# Patient Record
Sex: FEMALE | Race: BLACK OR AFRICAN AMERICAN | Employment: OTHER | URBAN - METROPOLITAN AREA
[De-identification: names, ages, dates, MRNs, and addresses within clinical notes are randomized per-mention and may not be internally consistent; named-entity substitution may affect disease eponyms.]

---

## 2017-02-11 ENCOUNTER — APPOINTMENT (OUTPATIENT)
Dept: GENERAL RADIOLOGY | Age: 32
End: 2017-02-11
Attending: PHYSICIAN ASSISTANT
Payer: COMMERCIAL

## 2017-02-11 ENCOUNTER — HOSPITAL ENCOUNTER (OUTPATIENT)
Age: 32
Discharge: HOME | End: 2017-02-11
Payer: COMMERCIAL

## 2017-02-11 VITALS
DIASTOLIC BLOOD PRESSURE: 75 MMHG | RESPIRATION RATE: 20 BRPM | SYSTOLIC BLOOD PRESSURE: 142 MMHG | HEART RATE: 92 BPM | TEMPERATURE: 98 F | OXYGEN SATURATION: 99 %

## 2017-02-11 DIAGNOSIS — J11.1 INFLUENZA: Primary | ICD-10-CM

## 2017-02-11 DIAGNOSIS — J98.01 ACUTE BRONCHOSPASM: ICD-10-CM

## 2017-02-11 PROCEDURE — 99214 OFFICE O/P EST MOD 30 MIN: CPT

## 2017-02-11 PROCEDURE — 94640 AIRWAY INHALATION TREATMENT: CPT

## 2017-02-11 PROCEDURE — 99204 OFFICE O/P NEW MOD 45 MIN: CPT

## 2017-02-11 PROCEDURE — 71020 XR CHEST PA + LAT CHEST (CPT=71020): CPT

## 2017-02-11 RX ORDER — ALBUTEROL SULFATE 90 UG/1
2 AEROSOL, METERED RESPIRATORY (INHALATION) EVERY 4 HOURS PRN
Qty: 1 INHALER | Refills: 0 | Status: SHIPPED | OUTPATIENT
Start: 2017-02-11 | End: 2017-03-13

## 2017-02-11 RX ORDER — CODEINE PHOSPHATE AND GUAIFENESIN 10; 100 MG/5ML; MG/5ML
10 SOLUTION ORAL EVERY 6 HOURS PRN
Qty: 180 ML | Refills: 0 | Status: SHIPPED | OUTPATIENT
Start: 2017-02-11 | End: 2017-02-16

## 2017-02-11 RX ORDER — ALBUTEROL SULFATE 2.5 MG/3ML
2.5 SOLUTION RESPIRATORY (INHALATION) ONCE
Status: COMPLETED | OUTPATIENT
Start: 2017-02-11 | End: 2017-02-11

## 2017-02-11 RX ORDER — IPRATROPIUM BROMIDE AND ALBUTEROL SULFATE 2.5; .5 MG/3ML; MG/3ML
3 SOLUTION RESPIRATORY (INHALATION) ONCE
Status: COMPLETED | OUTPATIENT
Start: 2017-02-11 | End: 2017-02-11

## 2017-02-11 NOTE — ED PROVIDER NOTES
Patient Seen in: [facility] [place], [place] Immediate Care In [facility]    History   Patient presents with:  Cough/URI  Fever    Stated Complaint: cough & fever X 4 days    HPI    Robson Rogeliodarien is a 77-year-old female presents today for evaluation of cough, congestion and fever Smokeless Status: Never Used                        Alcohol Use: Yes           0.0 - 0.5 oz/week       0-1 Standard drinks or equivalent per week       Comment: occas      Review of Systems   All other systems reviewed and are negative.       Positive for s and the left lower field. She has no wheezes. She has no rhonchi. She has no rales. Musculoskeletal: She exhibits no edema or tenderness. Neurological: She is alert and oriented to person, place, and time. Skin: Skin is warm and dry.  She is not diaph mL by mouth every 6 (six) hours as needed for cough. Dispense:  180 mL   Refill:  0    MDM   Clinical impression: Influenza, bronchospasm  Plan: I review chest x-ray findings with the patient.   On reexamination after a DuoNeb and albuterol treatments, pa

## 2017-02-11 NOTE — ED INITIAL ASSESSMENT (HPI)
C/O dry cough for 4 days taking Dayquil, Nyquil, And Vicks with no relief. Chest hurts from coughing, vomiting twice last night from coughing so hard.   Fever started last night

## 2017-02-12 NOTE — ED NOTES
Called patient regarding Smart ER fax. Pt states she had low grade temp- 100.4 and felt dizzy upon waking this am. Pt states dizziness lasted only briefly. Feeling better this afternoon.   Reminded patient that cough syrup with codeine can cause dizziness

## 2017-06-28 ENCOUNTER — OFFICE VISIT (OUTPATIENT)
Dept: INTERNAL MEDICINE CLINIC | Facility: CLINIC | Age: 32
End: 2017-06-28

## 2017-06-28 ENCOUNTER — HOSPITAL ENCOUNTER (OUTPATIENT)
Dept: ULTRASOUND IMAGING | Age: 32
Discharge: HOME | End: 2017-06-28
Attending: PHYSICIAN ASSISTANT
Payer: COMMERCIAL

## 2017-06-28 ENCOUNTER — LAB ENCOUNTER (OUTPATIENT)
Dept: LAB | Age: 32
End: 2017-06-28
Attending: PHYSICIAN ASSISTANT
Payer: COMMERCIAL

## 2017-06-28 VITALS
HEIGHT: 61 IN | DIASTOLIC BLOOD PRESSURE: 70 MMHG | HEART RATE: 60 BPM | RESPIRATION RATE: 16 BRPM | WEIGHT: 236 LBS | SYSTOLIC BLOOD PRESSURE: 112 MMHG | TEMPERATURE: 98 F | BODY MASS INDEX: 44.56 KG/M2

## 2017-06-28 DIAGNOSIS — R11.2 NON-INTRACTABLE VOMITING WITH NAUSEA, UNSPECIFIED VOMITING TYPE: ICD-10-CM

## 2017-06-28 DIAGNOSIS — R10.9 ABDOMINAL CRAMPING: ICD-10-CM

## 2017-06-28 DIAGNOSIS — R10.32 LLQ ABDOMINAL PAIN: ICD-10-CM

## 2017-06-28 DIAGNOSIS — R10.11 RUQ ABDOMINAL PAIN: ICD-10-CM

## 2017-06-28 DIAGNOSIS — R11.2 NON-INTRACTABLE VOMITING WITH NAUSEA, UNSPECIFIED VOMITING TYPE: Primary | ICD-10-CM

## 2017-06-28 DIAGNOSIS — R21 RASH: ICD-10-CM

## 2017-06-28 PROCEDURE — 84443 ASSAY THYROID STIM HORMONE: CPT

## 2017-06-28 PROCEDURE — 99214 OFFICE O/P EST MOD 30 MIN: CPT | Performed by: PHYSICIAN ASSISTANT

## 2017-06-28 PROCEDURE — 76700 US EXAM ABDOM COMPLETE: CPT | Performed by: PHYSICIAN ASSISTANT

## 2017-06-28 PROCEDURE — 81003 URINALYSIS AUTO W/O SCOPE: CPT | Performed by: PHYSICIAN ASSISTANT

## 2017-06-28 PROCEDURE — 81025 URINE PREGNANCY TEST: CPT | Performed by: PHYSICIAN ASSISTANT

## 2017-06-28 PROCEDURE — 36415 COLL VENOUS BLD VENIPUNCTURE: CPT

## 2017-06-28 PROCEDURE — 85025 COMPLETE CBC W/AUTO DIFF WBC: CPT

## 2017-06-28 PROCEDURE — 83690 ASSAY OF LIPASE: CPT

## 2017-06-28 PROCEDURE — 80053 COMPREHEN METABOLIC PANEL: CPT

## 2017-06-28 NOTE — PROGRESS NOTES
King Harvey is a 32year old female. HPI:   Patient presents for GI symptoms x 6 months. C/o episodes of uncontrollable diarrhea (loose stool, not watery) occurring a few times a month. Feeling nauseated on nearly a daily basis.   Has had two epi pain  GI: per HPI  : per HPI  NEURO: denies headaches  EXT: denies edema    EXAM:   /70   Pulse 60   Temp 98.3 °F (36.8 °C) (Oral)   Resp 16   Ht 61\"   Wt 236 lb   BMI 44.59 kg/m²   GENERAL: well developed, well nourished, in no acute distress  SK

## 2017-06-28 NOTE — PATIENT INSTRUCTIONS
GI Symptoms:  - start Prilosec (omeprazole) 20 mg - take 1 tablet each morning with a full glass of water at least 30 minutes before having anything else to eat or drink  - minimize caffeine, alcohol, citrus fruits/juices, spicy foods, and fried/fatty/grea

## 2017-08-18 ENCOUNTER — HOSPITAL ENCOUNTER (OUTPATIENT)
Dept: GENERAL RADIOLOGY | Age: 32
Discharge: HOME | End: 2017-08-18
Attending: PHYSICIAN ASSISTANT
Payer: MEDICAID

## 2017-08-18 ENCOUNTER — OFFICE VISIT (OUTPATIENT)
Dept: INTERNAL MEDICINE CLINIC | Facility: CLINIC | Age: 32
End: 2017-08-18

## 2017-08-18 VITALS
DIASTOLIC BLOOD PRESSURE: 80 MMHG | BODY MASS INDEX: 41.54 KG/M2 | WEIGHT: 220 LBS | OXYGEN SATURATION: 98 % | SYSTOLIC BLOOD PRESSURE: 122 MMHG | TEMPERATURE: 98 F | HEART RATE: 79 BPM | HEIGHT: 61 IN

## 2017-08-18 DIAGNOSIS — M79.671 RIGHT FOOT PAIN: ICD-10-CM

## 2017-08-18 DIAGNOSIS — M54.41 ACUTE BILATERAL LOW BACK PAIN WITH RIGHT-SIDED SCIATICA: Primary | ICD-10-CM

## 2017-08-18 PROCEDURE — 73630 X-RAY EXAM OF FOOT: CPT | Performed by: PHYSICIAN ASSISTANT

## 2017-08-18 PROCEDURE — 99213 OFFICE O/P EST LOW 20 MIN: CPT | Performed by: PHYSICIAN ASSISTANT

## 2017-08-18 RX ORDER — HYDROCODONE BITARTRATE AND ACETAMINOPHEN 5; 325 MG/1; MG/1
1 TABLET ORAL EVERY 6 HOURS PRN
COMMUNITY
End: 2020-01-06 | Stop reason: ALTCHOICE

## 2017-08-18 RX ORDER — CYCLOBENZAPRINE HCL 5 MG
5 TABLET ORAL 3 TIMES DAILY PRN
COMMUNITY
End: 2020-01-06 | Stop reason: ALTCHOICE

## 2017-08-18 NOTE — PATIENT INSTRUCTIONS
Back Pain / Sciatica:  - may take tylenol (acetaminophen) 1,000 mg every 8 hours as needed (do not exceed 3,000 mg daily)  - may take ibuprofen 600 mg every 8 hours WITH food as needed  - muscle relaxer as needed  - alternate ice and heat (10-15 minutes at

## 2017-08-18 NOTE — PROGRESS NOTES
HPI:   Jose Augustine is a 32year old female who is here for f/u from ER visit on 8/16/17 at [facility].  She was seen for R sided low back pain which began radiating into the lateral aspect of her R thigh, stops at knee.   Describes a consta vomiting, change in BMs  : denies dysuria, hematuria  MUSCULOSKELETAL: denies other joint pain or swelling  NEURO: per HPI    EXAM:   /80 (BP Location: Left arm, Patient Position: Sitting, Cuff Size: large)   Pulse 79   Temp 98.1 °F (36.7 °C) (Oral

## 2018-01-15 ENCOUNTER — TELEPHONE (OUTPATIENT)
Dept: INTERNAL MEDICINE CLINIC | Facility: CLINIC | Age: 33
End: 2018-01-15

## 2018-01-15 NOTE — TELEPHONE ENCOUNTER
Patient called same day to cancel appointment. Notified of [address] [place] [address] cancellation policy and stated that this would be considered a NO SHOW.  Patient felt that it was inappropriate that her appointment be considered a no show since we were closed yesterday an

## 2018-05-03 ENCOUNTER — MED REC SCAN ONLY (OUTPATIENT)
Dept: INTERNAL MEDICINE CLINIC | Facility: CLINIC | Age: 33
End: 2018-05-03

## 2019-01-22 ENCOUNTER — HOSPITAL ENCOUNTER (EMERGENCY)
Facility: HOSPITAL | Age: 34
Discharge: HOME | End: 2019-01-22
Attending: PEDIATRICS
Payer: MEDICAID

## 2019-01-22 ENCOUNTER — APPOINTMENT (OUTPATIENT)
Dept: GENERAL RADIOLOGY | Facility: HOSPITAL | Age: 34
End: 2019-01-22
Attending: PEDIATRICS
Payer: MEDICAID

## 2019-01-22 VITALS
RESPIRATION RATE: 18 BRPM | OXYGEN SATURATION: 100 % | HEART RATE: 87 BPM | BODY MASS INDEX: 47.2 KG/M2 | HEIGHT: 61 IN | DIASTOLIC BLOOD PRESSURE: 83 MMHG | SYSTOLIC BLOOD PRESSURE: 137 MMHG | WEIGHT: 250 LBS | TEMPERATURE: 99 F

## 2019-01-22 DIAGNOSIS — S46.919A REPETITIVE STRAIN INJURY OF UPPER EXTREMITY: Primary | ICD-10-CM
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
## 2019-01-22 PROCEDURE — 99283 EMERGENCY DEPT VISIT LOW MDM: CPT

## 2019-01-22 PROCEDURE — 73110 X-RAY EXAM OF WRIST: CPT | Performed by: PEDIATRICS

## 2019-01-22 NOTE — ED PROVIDER NOTES
Patient Seen in: [facility] Emergency Department    History   Patient presents with:  Upper Extremity Injury (musculoskeletal)    Stated Complaint: right hand pain and swelling    HPI    Patient is a 79-year-old female here with complaint of right h or gallops. Abdomen: Soft, nontender, nondistended. Bowel sounds present throughout. Extremities: Warm and well perfused. Dermatologic exam: No rashes or lesions. Neurologic exam: Cranial nerves 2-12 grossly intact.     Orthopedic exam: Slight swelling

## 2020-01-06 ENCOUNTER — OFFICE VISIT (OUTPATIENT)
Dept: INTERNAL MEDICINE CLINIC | Facility: CLINIC | Age: 35
End: 2020-01-06
Payer: MEDICAID

## 2020-01-06 VITALS
RESPIRATION RATE: 16 BRPM | SYSTOLIC BLOOD PRESSURE: 116 MMHG | BODY MASS INDEX: 41.86 KG/M2 | TEMPERATURE: 98 F | DIASTOLIC BLOOD PRESSURE: 66 MMHG | HEART RATE: 76 BPM | HEIGHT: 61 IN | WEIGHT: 221.75 LBS

## 2020-01-06 DIAGNOSIS — E66.01 MORBID OBESITY WITH BMI OF 40.0-44.9, ADULT (HCC): ICD-10-CM

## 2020-01-06 DIAGNOSIS — R10.2 PELVIC PAIN: ICD-10-CM

## 2020-01-06 DIAGNOSIS — N94.6 DYSMENORRHEA: ICD-10-CM

## 2020-01-06 DIAGNOSIS — Z00.00 ANNUAL PHYSICAL EXAM: Primary | ICD-10-CM

## 2020-01-06 DIAGNOSIS — R10.31 RLQ ABDOMINAL PAIN: ICD-10-CM

## 2020-01-06 DIAGNOSIS — Z12.4 CERVICAL CANCER SCREENING: ICD-10-CM

## 2020-01-06 PROCEDURE — 88175 CYTOPATH C/V AUTO FLUID REDO: CPT | Performed by: PHYSICIAN ASSISTANT

## 2020-01-06 PROCEDURE — 99395 PREV VISIT EST AGE 18-39: CPT | Performed by: PHYSICIAN ASSISTANT

## 2020-01-06 RX ORDER — GABAPENTIN 300 MG/1
300 CAPSULE ORAL
COMMUNITY
Start: 2019-10-02 | End: 2020-01-06 | Stop reason: ALTCHOICE

## 2020-01-06 NOTE — PATIENT INSTRUCTIONS
Please do your lab work ASAP. Remember to fast for 10-12 hours but drink plenty of water.     Please call Danyel Dennis at [phone] to set up the following tests:  - pelvic ultrasound

## 2020-01-06 NOTE — PROGRESS NOTES
Nahomy Hayes is a 29year old female who presents for a complete physical exam.   HPI:   Pt presents for annual wellness screening. C/o irregular periods for the past two years. Periods coming every 15-30 days, last 3-4 days.   C/o intermittent righ arthralgias  NEURO: denies headaches, numbness, tingling, weakness  BREAST: no pain, discharge, or lumps  HEMATOLOGIC: denies easy bruising or bleeding  LYMPH: denies swollen lymph nodes  ENDOCRINE: denies hot/cold intolerance    EXAM:   /66 (BP Loca 2016.    The patient indicates understanding of these issues and agrees to the plan. The patient is asked to return here in 1 year for wellness visit.

## 2020-02-13 ENCOUNTER — TELEPHONE (OUTPATIENT)
Dept: INTERNAL MEDICINE CLINIC | Facility: CLINIC | Age: 35
End: 2020-02-13

## 2020-02-13 DIAGNOSIS — L65.9 HAIR LOSS: Primary | ICD-10-CM

## 2020-02-13 NOTE — TELEPHONE ENCOUNTER
Patient calling in requesting a referral/order to see a Dermatologist, Dr Jose Velazquez, at [facility]. Pt stated she already contacted her insurance ([facility]), and that specialist is covered. Pt stated she is experiencing hair loss, and the derm will assist with scalp care and rejuvenation. Pt has an appointment on Monday, 02/17/2020. Please call pt with order status.

## 2020-02-18 ENCOUNTER — LAB ENCOUNTER (OUTPATIENT)
Dept: LAB | Age: 35
End: 2020-02-18
Attending: DERMATOLOGY
Payer: MEDICAID

## 2020-02-18 DIAGNOSIS — N94.6 DYSMENORRHEA: ICD-10-CM

## 2020-02-18 DIAGNOSIS — L66.9 ALOPECIA CICATRISATA: Primary | ICD-10-CM

## 2020-02-18 DIAGNOSIS — Z00.00 ANNUAL PHYSICAL EXAM: ICD-10-CM

## 2020-02-18 DIAGNOSIS — E66.01 MORBID OBESITY WITH BMI OF 40.0-44.9, ADULT (HCC): ICD-10-CM

## 2020-02-18 LAB
ANION GAP SERPL CALC-SCNC: 7 MMOL/L (ref 0–18)
BASOPHILS # BLD AUTO: 0.04 X10(3) UL (ref 0–0.2)
BASOPHILS NFR BLD AUTO: 0.6 %
BUN BLD-MCNC: 16 MG/DL (ref 7–18)
BUN/CREAT SERPL: 20.5 (ref 10–20)
CALCIUM BLD-MCNC: 8.8 MG/DL (ref 8.5–10.1)
CHLORIDE SERPL-SCNC: 106 MMOL/L (ref 98–112)
CO2 SERPL-SCNC: 21 MMOL/L (ref 21–32)
CREAT BLD-MCNC: 0.78 MG/DL (ref 0.55–1.02)
DEPRECATED HBV CORE AB SER IA-ACNC: 23.2 NG/ML (ref 12–160)
DEPRECATED RDW RBC AUTO: 39.4 FL (ref 35.1–46.3)
EOSINOPHIL # BLD AUTO: 0.09 X10(3) UL (ref 0–0.7)
EOSINOPHIL NFR BLD AUTO: 1.4 %
ERYTHROCYTE [DISTWIDTH] IN BLOOD BY AUTOMATED COUNT: 13.9 % (ref 11–15)
GLUCOSE BLD-MCNC: 86 MG/DL (ref 70–99)
HCT VFR BLD AUTO: 40.5 % (ref 35–48)
HGB BLD-MCNC: 13 G/DL (ref 12–16)
IMM GRANULOCYTES # BLD AUTO: 0.01 X10(3) UL (ref 0–1)
IMM GRANULOCYTES NFR BLD: 0.2 %
IRON SATURATION: 14 % (ref 15–50)
IRON SERPL-MCNC: 51 UG/DL (ref 50–170)
LYMPHOCYTES # BLD AUTO: 1.56 X10(3) UL (ref 1–4)
LYMPHOCYTES NFR BLD AUTO: 24.6 %
MCH RBC QN AUTO: 25.2 PG (ref 26–34)
MCHC RBC AUTO-ENTMCNC: 32.1 G/DL (ref 31–37)
MCV RBC AUTO: 78.6 FL (ref 80–100)
MONOCYTES # BLD AUTO: 0.33 X10(3) UL (ref 0.1–1)
MONOCYTES NFR BLD AUTO: 5.2 %
NEUTROPHILS # BLD AUTO: 4.31 X10 (3) UL (ref 1.5–7.7)
NEUTROPHILS # BLD AUTO: 4.31 X10(3) UL (ref 1.5–7.7)
NEUTROPHILS NFR BLD AUTO: 68 %
OSMOLALITY SERPL CALC.SUM OF ELEC: 278 MOSM/KG (ref 275–295)
PATIENT FASTING Y/N/NP: YES
PLATELET # BLD AUTO: 386 10(3)UL (ref 150–450)
POTASSIUM SERPL-SCNC: 3.7 MMOL/L (ref 3.5–5.1)
RBC # BLD AUTO: 5.15 X10(6)UL (ref 3.8–5.3)
SODIUM SERPL-SCNC: 134 MMOL/L (ref 136–145)
TOTAL IRON BINDING CAPACITY: 362 UG/DL (ref 240–450)
TRANSFERRIN SERPL-MCNC: 243 MG/DL (ref 200–360)
TSI SER-ACNC: 1.21 MIU/ML (ref 0.36–3.74)
VIT D+METAB SERPL-MCNC: 10.6 NG/ML (ref 30–100)
WBC # BLD AUTO: 6.3 X10(3) UL (ref 4–11)

## 2020-02-18 PROCEDURE — 82728 ASSAY OF FERRITIN: CPT

## 2020-02-18 PROCEDURE — 80048 BASIC METABOLIC PNL TOTAL CA: CPT

## 2020-02-18 PROCEDURE — 84443 ASSAY THYROID STIM HORMONE: CPT

## 2020-02-18 PROCEDURE — 83540 ASSAY OF IRON: CPT

## 2020-02-18 PROCEDURE — 83550 IRON BINDING TEST: CPT

## 2020-02-18 PROCEDURE — 82306 VITAMIN D 25 HYDROXY: CPT

## 2020-02-18 PROCEDURE — 36415 COLL VENOUS BLD VENIPUNCTURE: CPT

## 2020-02-18 PROCEDURE — 85025 COMPLETE CBC W/AUTO DIFF WBC: CPT

## 2020-03-02 ENCOUNTER — HOSPITAL ENCOUNTER (OUTPATIENT)
Dept: ULTRASOUND IMAGING | Age: 35
Discharge: HOME | End: 2020-03-02
Attending: PHYSICIAN ASSISTANT
Payer: MEDICAID

## 2020-03-02 DIAGNOSIS — R10.2 PELVIC PAIN: ICD-10-CM

## 2020-03-02 DIAGNOSIS — N94.6 DYSMENORRHEA: ICD-10-CM

## 2020-03-02 DIAGNOSIS — R10.31 RLQ ABDOMINAL PAIN: ICD-10-CM

## 2020-05-19 ENCOUNTER — OFFICE VISIT (OUTPATIENT)
Dept: INTERNAL MEDICINE CLINIC | Facility: CLINIC | Age: 35
End: 2020-05-19
Payer: MEDICAID

## 2020-05-19 ENCOUNTER — TELEPHONE (OUTPATIENT)
Dept: INTERNAL MEDICINE CLINIC | Facility: CLINIC | Age: 35
End: 2020-05-19

## 2020-05-19 VITALS
SYSTOLIC BLOOD PRESSURE: 118 MMHG | RESPIRATION RATE: 16 BRPM | HEIGHT: 61 IN | WEIGHT: 223.25 LBS | DIASTOLIC BLOOD PRESSURE: 74 MMHG | BODY MASS INDEX: 42.15 KG/M2 | HEART RATE: 79 BPM | OXYGEN SATURATION: 95 % | TEMPERATURE: 97 F

## 2020-05-19 DIAGNOSIS — S06.0X0A CONCUSSION WITHOUT LOSS OF CONSCIOUSNESS, INITIAL ENCOUNTER: Primary | ICD-10-CM

## 2020-05-19 PROCEDURE — 99213 OFFICE O/P EST LOW 20 MIN: CPT | Performed by: NURSE PRACTITIONER

## 2020-05-19 PROCEDURE — 3078F DIAST BP <80 MM HG: CPT | Performed by: NURSE PRACTITIONER

## 2020-05-19 PROCEDURE — 3008F BODY MASS INDEX DOCD: CPT | Performed by: NURSE PRACTITIONER

## 2020-05-19 PROCEDURE — 3074F SYST BP LT 130 MM HG: CPT | Performed by: NURSE PRACTITIONER

## 2020-05-19 RX ORDER — CLOBETASOL PROPIONATE 0.5 MG/G
OINTMENT TOPICAL
COMMUNITY
Start: 2020-03-16

## 2020-05-19 NOTE — PROGRESS NOTES
Patient presents with:  Head Injury    HPI:   Wolfgang Hawthorne is a 58 [address] old female who presents to the office for evaluation of concussion.      Date of incident: 5/17/20  Details: slipped on water in basement and hit head on work station that is metal, excessive computer time  · Tylenol/ibuprofen as needed. Call/return with any worsening symptoms or is symptoms do not continue to improve. Worsening sx include: You vomit more than 3 times, severe headache, or a headache that gets worse.   You have a sei

## 2020-05-19 NOTE — TELEPHONE ENCOUNTER
Pt had a fall on Sunday. Slip and fell due to stepping on wet floor. Sore on top of head and R ribs    Woke up yesterday and was \"ok\" at morning time   During afternoon feeling tired/ nausea/ dizzy when sitting down.      Today feels nausea/ sensiti

## 2020-05-19 NOTE — TELEPHONE ENCOUNTER
rec in office appt as long as no fever or sick symptoms. Spoke with KR -- please schedule with her at 4 or 4:30 today if pt able to make it. Clint notified.

## 2020-05-19 NOTE — TELEPHONE ENCOUNTER
Pt offered appointment for today but was not able to come into office. She was also offered several appointment over the next few days but stated that she will go to [facility] today for eval/treatment.

## 2020-05-19 NOTE — PATIENT INSTRUCTIONS
Concussion    A concussion is a type of brain injury. It can be caused by a direct hit or blow to the head, neck, face, or body.  The force of the blow makes the head and brain shake quickly back and forth. In some cases you may lose consciousness. Depend · You may use acetaminophen to control pain, unless another pain medicine was prescribed.  Don't use aspirin or ibuprofen after a head injury. If you have long-lasting (chronic) liver or kidney disease, talk with your healthcare provider before using these · Fluid draining from or bleeding from the nose or ears  Karen last reviewed this educational content on 6/1/2018  © 0225-5944 [employer]. [address] [facility], [address] [place] [address]. All rights reserved.  This information is not intended as

## 2020-08-13 ENCOUNTER — TELEPHONE (OUTPATIENT)
Dept: INTERNAL MEDICINE CLINIC | Facility: CLINIC | Age: 35
End: 2020-08-13

## 2020-08-13 NOTE — TELEPHONE ENCOUNTER
Patient calling to request order for COVID 19 testing; she past few days feels:  Fatigued,sore throat, vomiting, headache, body pain/muscle, no fever and nausea.   She thought it was because she worked out, was a smoker,

## 2020-09-14 ENCOUNTER — OFFICE VISIT (OUTPATIENT)
Dept: INTERNAL MEDICINE CLINIC | Facility: CLINIC | Age: 35
End: 2020-09-14
Payer: MEDICAID

## 2020-09-14 VITALS
TEMPERATURE: 98 F | HEART RATE: 78 BPM | WEIGHT: 231.19 LBS | DIASTOLIC BLOOD PRESSURE: 66 MMHG | SYSTOLIC BLOOD PRESSURE: 118 MMHG | RESPIRATION RATE: 16 BRPM | OXYGEN SATURATION: 99 % | HEIGHT: 61 IN | BODY MASS INDEX: 43.65 KG/M2

## 2020-09-14 DIAGNOSIS — M25.50 ARTHRALGIA, UNSPECIFIED JOINT: Primary | ICD-10-CM

## 2020-09-14 PROCEDURE — 99213 OFFICE O/P EST LOW 20 MIN: CPT | Performed by: INTERNAL MEDICINE

## 2020-09-14 PROCEDURE — 3074F SYST BP LT 130 MM HG: CPT | Performed by: INTERNAL MEDICINE

## 2020-09-14 PROCEDURE — 3078F DIAST BP <80 MM HG: CPT | Performed by: INTERNAL MEDICINE

## 2020-09-14 PROCEDURE — 3008F BODY MASS INDEX DOCD: CPT | Performed by: INTERNAL MEDICINE

## 2020-09-14 NOTE — PATIENT INSTRUCTIONS
Take advil 600 mg twice a day with food. Please have blood tests done. No need to fast for this. After blood tests, we will get in touch.  Vitamin D 2000 international units daily    **Effective August 17th, 2020 - To keep our patients and staff safe, ALL L

## 2020-09-14 NOTE — PROGRESS NOTES
Patient Office Visit    ASSESSMENT AND PLAN:   (M25.50) Arthralgia, unspecified joint  (primary encounter diagnosis)  Plan: ALT (SGPT), CALCIUM, CREATININE, SERUM,         HEMOGLOBIN A1C,         VITAMIN B12, VITAMIN D, 25-HYDROXY, RHEUMATOID         ARTHR Expiration Date: 9/14/2021      Rheumatoid Arthritis Factor [E]          Standing Status: Future          Standing Expiration Date: 9/14/2021      CK (Creatine Kinase) (Not Creatinine) (E)          Standing Status: Future          Standing Expiration Date: Social History:  Social History    Tobacco Use      Smoking status: Never Smoker      Smokeless tobacco: Never Used    Alcohol use: Yes      Comment: occ    Drug use: Yes      Frequency: 2.0 times per week      Types: Cannabis    Family History:  Fam bilaterally  Cardiovascular: nl s1 s2 no m/r/g  Pulmonary/Chest: CTA bilaterally with no wheezes  Musculoskeletal:  normal ROM in UE and LE, no swelling of the joints in the hands noted   Neurological:  no weakness in UE and LE, reflexes are normal  Psychi

## 2020-09-16 ENCOUNTER — LAB ENCOUNTER (OUTPATIENT)
Dept: LAB | Age: 35
End: 2020-09-16
Attending: INTERNAL MEDICINE
Payer: MEDICAID

## 2020-09-16 DIAGNOSIS — M25.50 ARTHRALGIA, UNSPECIFIED JOINT: ICD-10-CM

## 2020-09-16 DIAGNOSIS — Z00.00 ANNUAL PHYSICAL EXAM: ICD-10-CM

## 2020-09-16 DIAGNOSIS — E66.01 MORBID OBESITY WITH BMI OF 40.0-44.9, ADULT (HCC): ICD-10-CM

## 2020-09-16 LAB
ALT SERPL-CCNC: 25 U/L (ref 13–56)
AST SERPL-CCNC: 12 U/L (ref 15–37)
CALCIUM BLD-MCNC: 9.1 MG/DL (ref 8.5–10.1)
CHOLEST SMN-MCNC: 214 MG/DL (ref ?–200)
CK SERPL-CCNC: 59 U/L (ref 26–192)
CREAT BLD-MCNC: 0.82 MG/DL (ref 0.55–1.02)
EST. AVERAGE GLUCOSE BLD GHB EST-MCNC: 131 MG/DL (ref 68–126)
HBA1C MFR BLD HPLC: 6.2 % (ref ?–5.7)
HDLC SERPL-MCNC: 88 MG/DL (ref 40–59)
LDLC SERPL CALC-MCNC: 117 MG/DL (ref ?–100)
NONHDLC SERPL-MCNC: 126 MG/DL (ref ?–130)
PATIENT FASTING Y/N/NP: YES
RHEUMATOID FACT SERPL-ACNC: <10 IU/ML (ref ?–15)
SED RATE-ML: 18 MM/HR (ref 0–25)
TRIGL SERPL-MCNC: 47 MG/DL (ref 30–149)
VIT B12 SERPL-MCNC: 631 PG/ML (ref 193–986)
VIT D+METAB SERPL-MCNC: 11.9 NG/ML (ref 30–100)
VLDLC SERPL CALC-MCNC: 9 MG/DL (ref 0–30)

## 2020-09-16 PROCEDURE — 85652 RBC SED RATE AUTOMATED: CPT

## 2020-09-16 PROCEDURE — 80061 LIPID PANEL: CPT

## 2020-09-16 PROCEDURE — 86431 RHEUMATOID FACTOR QUANT: CPT

## 2020-09-16 PROCEDURE — 82607 VITAMIN B-12: CPT

## 2020-09-16 PROCEDURE — 82306 VITAMIN D 25 HYDROXY: CPT

## 2020-09-16 PROCEDURE — 36415 COLL VENOUS BLD VENIPUNCTURE: CPT

## 2020-09-16 PROCEDURE — 84450 TRANSFERASE (AST) (SGOT): CPT

## 2020-09-16 PROCEDURE — 82310 ASSAY OF CALCIUM: CPT

## 2020-09-16 PROCEDURE — 84460 ALANINE AMINO (ALT) (SGPT): CPT

## 2020-09-16 PROCEDURE — 83036 HEMOGLOBIN GLYCOSYLATED A1C: CPT

## 2020-09-16 PROCEDURE — 82565 ASSAY OF CREATININE: CPT

## 2020-09-16 PROCEDURE — 82550 ASSAY OF CK (CPK): CPT

## 2020-09-18 ENCOUNTER — TELEPHONE (OUTPATIENT)
Dept: INTERNAL MEDICINE CLINIC | Facility: CLINIC | Age: 35
End: 2020-09-18

## 2020-09-18 PROBLEM — E55.9 VITAMIN D DEFICIENCY: Status: ACTIVE | Noted: 2020-09-18

## 2020-09-18 RX ORDER — ERGOCALCIFEROL 1.25 MG/1
CAPSULE ORAL
Qty: 8 CAPSULE | Refills: 0 | Status: SHIPPED | OUTPATIENT
Start: 2020-09-18

## 2020-09-18 NOTE — PROGRESS NOTES
Dear RN, please let the patient know that her A1c is in the prediabetes range (between 5.7 to 6.4). This level can if she Continues to exercise at least 150 minutes a week and Eat a plant based diet. Her vitamin D levels are very low as well.  This number s

## 2021-06-16 DIAGNOSIS — E55.9 VITAMIN D DEFICIENCY: Primary | ICD-10-CM

## 2021-06-16 RX ORDER — ERGOCALCIFEROL 1.25 MG/1
CAPSULE ORAL
Qty: 8 CAPSULE | Refills: 0 | OUTPATIENT
Start: 2021-06-16

## 2021-06-16 NOTE — TELEPHONE ENCOUNTER
Medication(s) to Refill:   Requested Prescriptions     Pending Prescriptions Disp Refills   • ergocalciferol 1.25 MG (49370 UT) Oral Cap 8 capsule 0     Sig: Take one tablet weekly, please take 2000 international units after completing treatment       LOV:

## 2021-07-14 ENCOUNTER — HOSPITAL ENCOUNTER (OUTPATIENT)
Age: 36
Discharge: HOME | End: 2021-07-14
Payer: MEDICAID

## 2021-07-14 ENCOUNTER — APPOINTMENT (OUTPATIENT)
Dept: GENERAL RADIOLOGY | Age: 36
End: 2021-07-14
Attending: PHYSICIAN ASSISTANT
Payer: MEDICAID

## 2021-07-14 VITALS
HEART RATE: 80 BPM | DIASTOLIC BLOOD PRESSURE: 70 MMHG | OXYGEN SATURATION: 97 % | TEMPERATURE: 98 F | SYSTOLIC BLOOD PRESSURE: 130 MMHG | RESPIRATION RATE: 20 BRPM

## 2021-07-14 DIAGNOSIS — S92.515A CLOSED NONDISPLACED FRACTURE OF PROXIMAL PHALANX OF LESSER TOE OF LEFT FOOT, INITIAL ENCOUNTER: Primary | ICD-10-CM

## 2021-07-14 PROCEDURE — 28510 TREATMENT OF TOE FRACTURE: CPT

## 2021-07-14 PROCEDURE — 99213 OFFICE O/P EST LOW 20 MIN: CPT

## 2021-07-14 PROCEDURE — 73660 X-RAY EXAM OF TOE(S): CPT | Performed by: PHYSICIAN ASSISTANT

## 2021-07-14 RX ORDER — IBUPROFEN 600 MG/1
600 TABLET ORAL ONCE
Status: COMPLETED | OUTPATIENT
Start: 2021-07-14 | End: 2021-07-14

## 2021-07-14 RX ORDER — IBUPROFEN 600 MG/1
600 TABLET ORAL EVERY 8 HOURS PRN
Qty: 30 TABLET | Refills: 0 | Status: SHIPPED | OUTPATIENT
Start: 2021-07-14 | End: 2021-07-21

## 2021-07-14 RX ORDER — ESCITALOPRAM OXALATE 5 MG/1
5 TABLET ORAL DAILY
COMMUNITY
Start: 2021-07-01

## 2021-07-14 NOTE — ED INITIAL ASSESSMENT (HPI)
Pt hit her left foot/ 5th toe on the edge of a bookshelf last night and injured it.   Took ibuprofen and it helped last night

## 2021-07-14 NOTE — ED PROVIDER NOTES
Patient Seen in: [facility] [place]      History   Patient presents with:   Toe Injury    Stated Complaint: foot pain / toe pain    HPI/Subjective:   HPI    24-year-old female who comes in today complaining of pain to the fifth toe on her left shiv Cardiovascular:      Rate and Rhythm: Normal rate. Pulmonary:      Effort: Pulmonary effort is normal. No respiratory distress. Breath sounds: No wheezing or rales. Musculoskeletal:      Cervical back: Normal range of motion.       Right foot: No persistent or worsening pain see orthopedic  Discussed rice instructions anti-inflammatory injection    The patient is in good condition throughout her visit today and remains so upon discharge.  I discuss the plan of care with the patient, who expresses u

## 2021-07-23 NOTE — ED NOTES
Received a call from [address] [place] [address] re: interaction with Ibuprofen and Escitalopram.  Discussed with Dr. Yane Olivia: [facility] pharmacy may fill Ibuprofen. \"  Called back to Pharmacist Aminta and made aware.

## 2022-02-07 ENCOUNTER — HOSPITAL ENCOUNTER (OUTPATIENT)
Age: 37
Discharge: HOME | End: 2022-02-07
Payer: MEDICAID

## 2022-02-07 VITALS
RESPIRATION RATE: 14 BRPM | HEIGHT: 61 IN | BODY MASS INDEX: 43.8 KG/M2 | TEMPERATURE: 98 F | WEIGHT: 232 LBS | DIASTOLIC BLOOD PRESSURE: 97 MMHG | OXYGEN SATURATION: 98 % | SYSTOLIC BLOOD PRESSURE: 128 MMHG | HEART RATE: 110 BPM

## 2022-02-07 DIAGNOSIS — L08.9 INFECTION OF SKIN OF FINGER: Primary | ICD-10-CM

## 2022-02-07 PROCEDURE — 99213 OFFICE O/P EST LOW 20 MIN: CPT

## 2022-02-07 RX ORDER — IBUPROFEN 600 MG/1
600 TABLET ORAL ONCE
Status: COMPLETED | OUTPATIENT
Start: 2022-02-07 | End: 2022-02-07

## 2022-02-07 RX ORDER — CEPHALEXIN 500 MG/1
500 CAPSULE ORAL 4 TIMES DAILY
Qty: 40 CAPSULE | Refills: 0 | Status: SHIPPED | OUTPATIENT
Start: 2022-02-07 | End: 2022-02-17

## 2022-02-07 RX ORDER — FLUCONAZOLE 150 MG/1
150 TABLET ORAL ONCE
Qty: 1 TABLET | Refills: 0 | Status: SHIPPED | OUTPATIENT
Start: 2022-02-07 | End: 2022-02-07

## 2022-02-21 ENCOUNTER — HOSPITAL ENCOUNTER (OUTPATIENT)
Age: 37
Discharge: HOME | End: 2022-02-21
Payer: MEDICAID

## 2022-02-21 VITALS
HEART RATE: 102 BPM | SYSTOLIC BLOOD PRESSURE: 141 MMHG | BODY MASS INDEX: 43.8 KG/M2 | RESPIRATION RATE: 18 BRPM | WEIGHT: 232 LBS | DIASTOLIC BLOOD PRESSURE: 60 MMHG | OXYGEN SATURATION: 100 % | TEMPERATURE: 98 F | HEIGHT: 61 IN

## 2022-02-21 DIAGNOSIS — T78.40XA ALLERGIC REACTION, INITIAL ENCOUNTER: Primary | ICD-10-CM

## 2022-02-21 PROCEDURE — 99213 OFFICE O/P EST LOW 20 MIN: CPT

## 2022-02-21 RX ORDER — PREDNISONE 20 MG/1
60 TABLET ORAL ONCE
Status: COMPLETED | OUTPATIENT
Start: 2022-02-21 | End: 2022-02-21

## 2022-02-21 RX ORDER — FAMOTIDINE 20 MG/1
20 TABLET, FILM COATED ORAL ONCE
Status: COMPLETED | OUTPATIENT
Start: 2022-02-21 | End: 2022-02-21

## 2022-02-21 RX ORDER — PREDNISONE 20 MG/1
60 TABLET ORAL DAILY
Qty: 12 TABLET | Refills: 0 | Status: SHIPPED | OUTPATIENT
Start: 2022-02-22 | End: 2022-02-26

## 2022-05-05 ENCOUNTER — APPOINTMENT (OUTPATIENT)
Dept: GENERAL RADIOLOGY | Age: 37
End: 2022-05-05
Attending: NURSE PRACTITIONER
Payer: MEDICAID

## 2022-05-05 ENCOUNTER — HOSPITAL ENCOUNTER (OUTPATIENT)
Age: 37
Discharge: HOME | End: 2022-05-05
Payer: MEDICAID

## 2022-05-05 VITALS
DIASTOLIC BLOOD PRESSURE: 67 MMHG | WEIGHT: 235 LBS | HEART RATE: 86 BPM | OXYGEN SATURATION: 99 % | TEMPERATURE: 98 F | BODY MASS INDEX: 44.37 KG/M2 | SYSTOLIC BLOOD PRESSURE: 101 MMHG | RESPIRATION RATE: 18 BRPM | HEIGHT: 61 IN

## 2022-05-05 DIAGNOSIS — M79.672 LEFT FOOT PAIN: Primary | ICD-10-CM

## 2022-05-05 PROCEDURE — 99213 OFFICE O/P EST LOW 20 MIN: CPT

## 2022-05-05 PROCEDURE — 73630 X-RAY EXAM OF FOOT: CPT | Performed by: NURSE PRACTITIONER

## 2023-12-12 ENCOUNTER — HOSPITAL ENCOUNTER (OUTPATIENT)
Age: 38
Discharge: HOME | End: 2023-12-12
Payer: MEDICAID

## 2023-12-12 VITALS
HEART RATE: 82 BPM | DIASTOLIC BLOOD PRESSURE: 80 MMHG | WEIGHT: 240 LBS | RESPIRATION RATE: 20 BRPM | OXYGEN SATURATION: 98 % | SYSTOLIC BLOOD PRESSURE: 100 MMHG | BODY MASS INDEX: 45 KG/M2 | TEMPERATURE: 98 F

## 2023-12-12 DIAGNOSIS — U07.1 COVID-19: Primary | ICD-10-CM

## 2023-12-12 LAB
POCT INFLUENZA A: NEGATIVE
POCT INFLUENZA B: NEGATIVE
S PYO AG THROAT QL IA.RAPID: NEGATIVE
SARS-COV-2 RNA RESP QL NAA+PROBE: DETECTED

## 2023-12-12 PROCEDURE — 99212 OFFICE O/P EST SF 10 MIN: CPT

## 2023-12-12 PROCEDURE — 87502 INFLUENZA DNA AMP PROBE: CPT | Performed by: NURSE PRACTITIONER

## 2023-12-12 PROCEDURE — 87651 STREP A DNA AMP PROBE: CPT | Performed by: EMERGENCY MEDICINE

## 2023-12-12 PROCEDURE — 87651 STREP A DNA AMP PROBE: CPT | Performed by: NURSE PRACTITIONER

## 2023-12-12 PROCEDURE — 99213 OFFICE O/P EST LOW 20 MIN: CPT

## 2023-12-12 NOTE — DISCHARGE INSTRUCTIONS
Rest and drink plenty of fluids. This will help to thin the mucous in the back of your throat. Take Tylenol and/or ibuprofen as needed for pain or fever. Use Zyrtec, Claritin, or Allegra to help with nasal drainage. You can take this up to twice a day. You can also take Sudafed to help with sinus pressure or pain. Get the medication behind the pharmacy counter. Use Nasonex or Flonase nasal spray. Take Robitussin or Delsym as needed for cough. Follow up with your PCP in 5-7 days. Quarantine at home until Thursday, and after this you can wear a mask for 5 days out. Your symptoms should improve in the next 7-10 days; however, the cough can linger for much longer. Thank you for choosing [facility] for your care.

## 2024-12-13 NOTE — DISCHARGE INSTRUCTIONS
Rest and drink plenty of fluids.   Use ibuprofen 600 mg every 6-8 hours as needed for discomfort.   Eat a soft diet.   Get a boil and bite mouthguard and follow directions to have this fit your mouth.   Wear this at night to help with teeth grinding.   Follow up with your Dentist as soon as possible.

## 2024-12-13 NOTE — ED PROVIDER NOTES
Patient Seen in: [facility] [place]      History   No chief complaint on file.    Stated Complaint: face pain    Subjective:   39 yo female presents to the immediate care with c/o jaw pain.  Patient states she woke up one morning about 3 week ago with left sided jaw pain. Pain is worse with chewing or yawning.  She states she has taken some Midol for pain with some improvement.  She just saw her dentist for a cap about 1 month ago.  Pain radiates into her left ear at times.  She denies any fever, chills, facial swelling, dental pain, ear drainage, or gum pain.     The history is provided by the patient.         Objective:     Past Medical History:    Dysmenorrhea    Headache    Menorrhagia    OBESITY              Past Surgical History:   Procedure Laterality Date    Appendectomy      Appendectomy            x2    Inguinal herniorrhaphy            x1    Other surgical history  10/2019    panniculectomy and bilat breast lift    Repair ing hernia,5+y/o,reducibl      Tubal ligation  2016                Social History     Socioeconomic History    Marital status:    Tobacco Use    Smoking status: Never    Smokeless tobacco: Never   Vaping Use    Vaping status: Never Used   Substance and Sexual Activity    Alcohol use: Yes     Comment: occ    Drug use: Yes     Frequency: 7.0 times per week     Types: Cannabis    Sexual activity: Yes     Partners: Male     Birth control/protection: I.U.D.     Comment:  since    Other Topics Concern    Caffeine Concern Yes     Comment: occ    Exercise Yes     Comment: 5x/week     Social Drivers of Health      [facility]    Social Connections   Housing Stability:   Recent Concern: Housing Stability - High Risk (2024)    Received from [facility]    Housing Stability     Are you concerned about having a safe and reliable place to live?: Yes               Review of Systems   Constitutional: Negative.  Negative for chills and fever.   HENT:  Positive for dental problem and ear pain. Negative for congestion, ear discharge, facial swelling, sinus pressure, sinus pain and sore throat.    All other systems reviewed and are negative.      Positive for stated complaint: face pain  Other systems are as noted in HPI.  Constitutional and vital signs reviewed.      All other systems reviewed and negative except as noted above.    Physical Exam     ED Triage Vitals [12/13/24 0900]   /69   Pulse 78   Resp 18   Temp 97.9 °F (36.6 °C)   Temp src Oral   SpO2 100 %   O2 Device None (Room air)       Current Vitals:   Vital Signs  BP: 129/69  Pulse: 78  Resp: 18  Temp: 97.9 °F (36.6 °C)  Temp src: Oral    Oxygen Therapy  SpO2: 100 %  O2 Device: None (Room air)        Physical Exam  Vitals and nursing note reviewed.   Constitutional:       General: She is not in acute distress.     Appearance: Normal appearance. She is normal weight. She is not ill-appearing.   HENT:      Head: Normocephalic and atraumatic.      Jaw: There is normal jaw occlusion. Tenderness and pain on movement present. No trismus.      Comments: Left TMJ tenderness with palpation.  No trismus, clicking, or dislocation.  Normal occulusion.      Right Ear: Tympanic membrane, ear canal and external ear normal.      Left Ear: Tympanic membrane, ear canal and external ear normal.      Nose: Nose normal.      Mouth/Throat:      Mouth: Mucous membranes are moist.      Dentition: Normal dentition. No dental tenderness, gingival swelling or dental abscesses.      Pharynx: Oropharynx is clear.   Eyes:      Conjunctiva/sclera: Conjunctivae normal.      Pupils: Pupils are equal, round, and reactive to light.   Cardiovascular:      Rate and Rhythm: Normal rate and regular rhythm.      Pulses: Normal pulses.      Heart sounds: Normal heart sounds.   Pulmonary:      Effort: Pulmonary effort is normal. No respiratory  distress.      Breath sounds: Normal breath sounds.   Musculoskeletal:         General: Normal range of motion.   Skin:     General: Skin is warm and dry.   Neurological:      General: No focal deficit present.      Mental Status: She is alert and oriented to person, place, and time.   Psychiatric:         Mood and Affect: Mood normal.         Behavior: Behavior normal.           ED Course   Labs Reviewed - No data to display       MDM        Medical Decision Making  39 yo female with left TMJ syndrome.  Discussed with patient supportive management at home with NSAIDS.  She will need to follow up with her dentist.  She states she is maxed out on her dental benefits for the year and will not be able to see her dentist until January.  Recommended getting an over the counter boil and bite mouthguard to help with teeth grinding.  Follow up with her PCP as needed.  No evidence of dental abscess, dental carries, otitis media, otitis externa, or trigeminal neuralgia.     Risk  OTC drugs.        Disposition and Plan     Clinical Impression:  1. TMJ syndrome         Disposition:  Discharge  12/13/2024  9:24 am    Follow-up:  Angela Jarvis MD  [address] [place] [id number] [phone]      As needed          Medications Prescribed:  There are no discharge medications for this patient.          Supplementary Documentation:

## 2025-06-22 NOTE — ED PROVIDER NOTES
Patient Seen in: [facility] [place]        History  Chief Complaint   Patient presents with    Breast Problem    Finger Pain    Finger Injury     Stated Complaint: Finger Pain    Subjective:   HPI            Patient is a pleasant 39-year-old female here for evaluation of left fifth phalanx pain and breast lesion.    Left fifth finger injury- file box fell and landed on the pinky-this occurred on May 8, 2025, about a month and a half ago.  Since injury, patient has had ongoing swelling which has been intermittent and pain with flexion and extension.  Patient states the area never bruised, however, she did feel a \"snap\" with severe pain with the injury.    Patient is right-handed.    Sore on breast noted about 2 weeks ago as a \"bump.\" getting bigger.  Dermatologist dx as a keloid.  Thursday area was itchy and it started bleeding.  No purulent fluid from the lesion.  Denies discharge from the nipple or surrounding cellulitis.          Objective:     Past Medical History:    Dysmenorrhea    Headache    Menorrhagia    OBESITY              Past Surgical History:   Procedure Laterality Date    Appendectomy      Appendectomy            x2    Inguinal herniorrhaphy            x1    Other surgical history  10/2019    panniculectomy and bilat breast lift    Repair ing hernia,5+y/o,reducibl      Tubal ligation  2016                Social History     Socioeconomic History    Marital status:    Tobacco Use    Smoking status: Never    Smokeless tobacco: Never   Vaping Use    Vaping status: Never Used   Substance and Sexual Activity    Alcohol use: Yes     Comment: occ    Drug use: Yes     Frequency: 7.0 times per week     Types: Cannabis    Sexual activity: Yes     Partners: Male     Birth control/protection: I.U.D.     Comment:  since    Other Topics Concern    Caffeine Concern Yes     Comment: occ    Exercise Yes     Comment: 5x/week     Social Drivers of Health     Housing  Stability:   Recent Concern: Housing Stability - High Risk (2/27/2024)    Received from [facility]    Housing Stability     Are you concerned about having a safe and reliable place to live?: Yes              Review of Systems    Positive for stated complaint: Finger Pain  Other systems are as noted in HPI.  Constitutional and vital signs reviewed.      All other systems reviewed and negative except as noted above.                  Physical Exam    ED Triage Vitals [06/22/25 1342]   BP (!) 149/105   Pulse 92   Resp 20   Temp 98 °F (36.7 °C)   Temp src Oral   SpO2 100 %   O2 Device None (Room air)       Current Vitals:   Vital Signs  BP: (!) 149/105  Pulse: 92  Resp: 20  Temp: 98 °F (36.7 °C)  Temp src: Oral    Oxygen Therapy  SpO2: 100 %  O2 Device: None (Room air)            Physical Exam  Vitals and nursing note reviewed.   Constitutional:       Appearance: Normal appearance.   HENT:      Mouth/Throat:      Mouth: Mucous membranes are moist.   Eyes:      Conjunctiva/sclera: Conjunctivae normal.      Pupils: Pupils are equal, round, and reactive to light.   Cardiovascular:      Rate and Rhythm: Normal rate.      Pulses: Normal pulses.   Pulmonary:      Effort: Pulmonary effort is normal.   Chest:      Chest wall: No tenderness.   Breasts:     Right: No nipple discharge.          Comments: Annular lesion on lower aspect of right right lower quadrant fo right breast. Skin is non intact.  No palpable borders, no purulent discharge  Neurological:      Mental Status: She is alert.               ED Course  Labs Reviewed - No data to display     XR HAND (MIN 3 VIEWS), LEFT (CPT=73130)  Result Date: 6/22/2025  PROCEDURE:  XR HAND (MIN 3 VIEWS), LEFT (CPT=73130)  TECHNIQUE:  Three views of the left hand were obtained.  COMPARISON:  None.  INDICATIONS:  4th and 5th finger pain after file box fell on hand 5/8/25  PATIENT STATED HISTORY: (As transcribed by Technologist)  Injuried 5th digit left hand a  month ago on filr box.  Patiemt states she felt a pop.  Pain 5th digit left hand.    FINDINGS: No fracture or dislocation. Joint spaces are relatively well maintained. No bone abnormality.  IMPRESSION: Unremarkable radiographs of the left hand.  LOCATION:  [facility]   Dictated by (CST): Jose Angel Pedro MD on 6/22/2025 at 2:49 PM     Finalized by (CST): Jose Angel Pedro MD on 6/22/2025 at 2:50 PM                     [facility]          Medical Decision Making  X-rays unremarkable for fracture.  Finger splint applied.  Advised for NSAID, splint and close outpatient follow-up with Ortho.      Breast lesion-close outpatient follow-up with dermatology.  Encouraged use of nonadhesive pads if serous fluid draining from nonintact skin.  Patient agrees with plan of care.  All questions answered to patient's satisfaction        Amount and/or Complexity of Data Reviewed  Radiology: ordered and independent interpretation performed. Decision-making details documented in ED Course.        Disposition and Plan     Clinical Impression:  1. Breast lesion    2. Finger pain, left         Disposition:  Discharge  6/22/2025  2:59 pm    Follow-up:  Azalia Bedoya  [address] [facility], 2nd floor  [facility] [address]  [phone]    Schedule an appointment as soon as possible for a visit       Dave Polanco MD  [address] [place] [address]  [facility] [id number] [phone]    Schedule an appointment as soon as possible for a visit   Hand    Virginie Srivastava MD  [address] [facility] [address]  [facility] [id number] [phone]      breast surgeon as needed          Medications Prescribed:  There are no discharge medications for this patient.            Supplementary Documentation:

## 2025-06-22 NOTE — ED INITIAL ASSESSMENT (HPI)
Left 5th finger injury - May 8  her finger got hit with a box  since then she has been having pain , Pt c/o soreness.   Right breast sore- noted 2 weeks ago  c/o bump  noted bleeding Thursday.

## (undated) NOTE — LETTER
08/07/20        [address]      Dear [facility],    Our records indicate that you have outstanding lab work and or testing that was ordered for you and has not yet been completed: Fasting lab work - (at least 10 h

## (undated) NOTE — LETTER
02/06/20        [address]      Dear Jose Guerra,    Our records indicate that you have outstanding lab work and or testing that was ordered for you and has not yet been completed:  Ultrasound of Pelvis - - Please Thank you,       Bisi Chen, PA

## (undated) NOTE — ED AVS SNAPSHOT
Jose Fawn   MRN: [id number]    Department:  [facility] Emergency Department   Date of Visit:  1/22/2019           Disclosure     Insurance plans vary and the physician(s) referred by the ER may not be covered by your plan.  Please contact yo tell this physician (or your personal doctor if your instructions are to return to your personal doctor) about any new or lasting problems. The primary care or specialist physician will see patients referred from the [facility] Emergency Department.  Juany Hill

## (undated) NOTE — ED AVS SNAPSHOT
[facility] in [address] [place] [address] Drive,[address]    [address] [place] [address]    Phone:  [phone]    Fax:  [facility]   MRN: [id number]    Department:  [facility] in [facility] END   Date of Visit:  2/11/2017 - Albuterol Sulfate  (90 Base) MCG/ACT Aers      You can get these medications from any pharmacy     Bring a paper prescription for each of these medications    - guaiFENesin-codeine 100-10 MG/5ML Soln              Discharge Instructions       Plea a detailed feedback survey mailed to them a week after the visit. If you receive this, we would really appreciate it if you could take the time to complete it. Thank you! You were examined and treated today on an urgent basis only.   This was not a ledezma 4454  [facility] ([address]) [facility] [address] ([address]) [address] [employer] Alondra Kay (Angela Freeman) [facility] [employer]     Call the [employer] for assistance with your inactive [employer] account    If you have questions, you can call [phone] to talk to our [facility] Staff. Remember, [employer] is NOT to be used for urgent needs.   For medical emergencies, d